# Patient Record
Sex: FEMALE | Race: WHITE | NOT HISPANIC OR LATINO | URBAN - METROPOLITAN AREA
[De-identification: names, ages, dates, MRNs, and addresses within clinical notes are randomized per-mention and may not be internally consistent; named-entity substitution may affect disease eponyms.]

---

## 2023-11-14 ENCOUNTER — TELEPHONE (OUTPATIENT)
Dept: TRANSPLANT | Facility: HOSPITAL | Age: 43
End: 2023-11-14
Payer: MEDICARE

## 2023-11-14 ENCOUNTER — DOCUMENTATION (OUTPATIENT)
Dept: TRANSPLANT | Facility: HOSPITAL | Age: 43
End: 2023-11-14
Payer: MEDICARE

## 2023-11-14 VITALS — WEIGHT: 194.67 LBS | BODY MASS INDEX: 35.82 KG/M2 | HEIGHT: 62 IN

## 2023-11-14 NOTE — PROGRESS NOTES
Do you have difficulty reading or writing in English?   no   What is the primary cause of your kidney disease?  Diabetes  Are you currently on dialysis?   yes  If yes, what days do you have your dialysis treatments?   T, TH, SAT  Have you received a transplant before?   no  If yes, what organ, and when and where was your transplant?     Have you been diagnosed with diabetes?    yes  Have you tested positive for hepatitis or HIV?   no  Have you ever been diagnosed with cancer?   no  If yes, what type of cancer, and when and where were you treated?     Do you have a history of a heart attack or stroke?   Yes, Heart Attack- 2021 Treated [facility], Pt had a CABG-  [facility]- 12/2021  Are you currently or have you previously been seen by a mental health professional?  Yes, Over 10 Yrs Ago  If yes, what is the name of your mental health provider?  Unknown  Are you a current or former tobacco user?   yes, Former  Do you have history of alcohol abuse or dependence?   no  Do you have a history of illegal drug abuse or dependence?   no  Has anyone told you they're willing to donate their kidney to you?   yes  Comments:  Intake Complete, Scheduled Virtual Edu.

## 2023-11-16 ENCOUNTER — HOSPITAL ENCOUNTER (INPATIENT)
Age: 43
End: 2023-11-16
Attending: SURGERY | Admitting: SURGERY
Payer: MEDICAID

## 2023-12-22 DIAGNOSIS — Z01.818 PRE-TRANSPLANT EVALUATION FOR KIDNEY TRANSPLANT: Primary | ICD-10-CM

## 2024-01-02 ENCOUNTER — TELEPHONE (OUTPATIENT)
Dept: TRANSPLANT | Facility: HOSPITAL | Age: 44
End: 2024-01-02
Payer: MEDICARE

## 2024-01-05 ENCOUNTER — APPOINTMENT (OUTPATIENT)
Dept: TRANSPLANT | Facility: HOSPITAL | Age: 44
End: 2024-01-05
Payer: MEDICARE

## 2024-01-05 ENCOUNTER — SOCIAL WORK (OUTPATIENT)
Dept: TRANSPLANT | Facility: HOSPITAL | Age: 44
End: 2024-01-05
Payer: MEDICARE

## 2024-01-05 ENCOUNTER — OFFICE VISIT (OUTPATIENT)
Dept: TRANSPLANT | Facility: HOSPITAL | Age: 44
End: 2024-01-05
Payer: MEDICARE

## 2024-01-05 ENCOUNTER — DOCUMENTATION (OUTPATIENT)
Dept: TRANSPLANT | Facility: HOSPITAL | Age: 44
End: 2024-01-05

## 2024-01-05 ENCOUNTER — LAB (OUTPATIENT)
Dept: LAB | Facility: LAB | Age: 44
End: 2024-01-05
Payer: MEDICARE

## 2024-01-05 ENCOUNTER — LAB REQUISITION (OUTPATIENT)
Dept: LAB | Facility: CLINIC | Age: 44
End: 2024-01-05
Payer: MEDICARE

## 2024-01-05 VITALS
SYSTOLIC BLOOD PRESSURE: 156 MMHG | HEART RATE: 89 BPM | BODY MASS INDEX: 35.26 KG/M2 | WEIGHT: 199 LBS | DIASTOLIC BLOOD PRESSURE: 106 MMHG | TEMPERATURE: 97.5 F | HEIGHT: 63 IN | OXYGEN SATURATION: 97 %

## 2024-01-05 VITALS
SYSTOLIC BLOOD PRESSURE: 156 MMHG | HEIGHT: 63 IN | TEMPERATURE: 97.5 F | DIASTOLIC BLOOD PRESSURE: 106 MMHG | HEART RATE: 89 BPM | BODY MASS INDEX: 35.26 KG/M2 | WEIGHT: 199 LBS | OXYGEN SATURATION: 97 %

## 2024-01-05 DIAGNOSIS — Z01.818 PRE-TRANSPLANT EVALUATION FOR KIDNEY TRANSPLANT: ICD-10-CM

## 2024-01-05 DIAGNOSIS — N18.6 END STAGE RENAL DISEASE (MULTI): ICD-10-CM

## 2024-01-05 DIAGNOSIS — Z79.899 OTHER LONG TERM (CURRENT) DRUG THERAPY: ICD-10-CM

## 2024-01-05 DIAGNOSIS — N18.9 CHRONIC KIDNEY DISEASE, UNSPECIFIED CKD STAGE: ICD-10-CM

## 2024-01-05 DIAGNOSIS — Z01.818 PRE-TRANSPLANT EVALUATION FOR KIDNEY TRANSPLANT: Primary | ICD-10-CM

## 2024-01-05 DIAGNOSIS — E11.21 DIABETES MELLITUS WITH KIDNEY DISEASE (MULTI): ICD-10-CM

## 2024-01-05 DIAGNOSIS — N18.6 ESRD (END STAGE RENAL DISEASE) (MULTI): ICD-10-CM

## 2024-01-05 DIAGNOSIS — I10 ESSENTIAL HYPERTENSION: ICD-10-CM

## 2024-01-05 LAB
ABO GROUP (TYPE) IN BLOOD: NORMAL
ALBUMIN SERPL BCP-MCNC: 4.3 G/DL (ref 3.4–5)
ALP SERPL-CCNC: 309 U/L (ref 33–110)
ALT SERPL W P-5'-P-CCNC: 18 U/L (ref 7–45)
AMYLASE SERPL-CCNC: 29 U/L (ref 29–103)
AST SERPL W P-5'-P-CCNC: 19 U/L (ref 9–39)
BILIRUB DIRECT SERPL-MCNC: 0.3 MG/DL (ref 0–0.3)
BILIRUB SERPL-MCNC: 0.8 MG/DL (ref 0–1.2)
BUN SERPL-MCNC: 36 MG/DL (ref 6–23)
C PEPTIDE SERPL-MCNC: 6.5 NG/ML (ref 0.7–3.9)
CMV IGG AVIDITY SERPL IA-RTO: REACTIVE %
CREAT SERPL-MCNC: 5.22 MG/DL (ref 0.5–1.05)
EBV EA IGG SER QL: NEGATIVE
EBV NA AB SER QL: POSITIVE
EBV VCA IGG SER IA-ACNC: POSITIVE
EBV VCA IGM SER IA-ACNC: ABNORMAL
ERYTHROCYTE [DISTWIDTH] IN BLOOD BY AUTOMATED COUNT: 17.6 % (ref 11.5–14.5)
EST. AVERAGE GLUCOSE BLD GHB EST-MCNC: 177 MG/DL
FLOW AUTOCROSSMATCH: NORMAL
GFR SERPL CREATININE-BSD FRML MDRD: 10 ML/MIN/1.73M*2
HBA1C MFR BLD: 7.8 %
HBV CORE AB SER QL: NONREACTIVE
HBV SURFACE AB SER-ACNC: >1000 MIU/ML
HBV SURFACE AG SERPL QL IA: NONREACTIVE
HCT VFR BLD AUTO: 35.5 % (ref 36–46)
HCV AB SER QL: NONREACTIVE
HGB BLD-MCNC: 11.1 G/DL (ref 12–16)
HIV 1+2 AB+HIV1 P24 AG SERPL QL IA: NONREACTIVE
HLA CLASS I AB SCREEN,FC: NORMAL
HLA CLASS II AB SCREEN,FC: NORMAL
HLA CLS I TYP PNL BLD/T DONR HIGH RES: NORMAL
HLA RESULTS: NORMAL
HLA-DP2 QL: NORMAL
HLA-DQB1 HIGH RES: NORMAL
HLA-DRB1 HIGH RES: NORMAL
INR PPP: 1.1 (ref 0.9–1.1)
MCH RBC QN AUTO: 33.7 PG (ref 26–34)
MCHC RBC AUTO-ENTMCNC: 31.3 G/DL (ref 32–36)
MCV RBC AUTO: 108 FL (ref 80–100)
NRBC BLD-RTO: 0 /100 WBCS (ref 0–0)
PHOSPHATE SERPL-MCNC: 4.2 MG/DL (ref 2.5–4.9)
PLATELET # BLD AUTO: 160 X10*3/UL (ref 150–450)
PROT SERPL-MCNC: 7.7 G/DL (ref 6.4–8.2)
PROTHROMBIN TIME: 12.8 SECONDS (ref 9.8–12.8)
RBC # BLD AUTO: 3.29 X10*6/UL (ref 4–5.2)
RH FACTOR (ANTIGEN D): NORMAL
T PALLIDUM AB SER QL: NONREACTIVE
WBC # BLD AUTO: 4.7 X10*3/UL (ref 4.4–11.3)

## 2024-01-05 PROCEDURE — 87340 HEPATITIS B SURFACE AG IA: CPT

## 2024-01-05 PROCEDURE — 99204 OFFICE O/P NEW MOD 45 MIN: CPT | Performed by: STUDENT IN AN ORGANIZED HEALTH CARE EDUCATION/TRAINING PROGRAM

## 2024-01-05 PROCEDURE — 85027 COMPLETE CBC AUTOMATED: CPT

## 2024-01-05 PROCEDURE — 84520 ASSAY OF UREA NITROGEN: CPT

## 2024-01-05 PROCEDURE — 84100 ASSAY OF PHOSPHORUS: CPT

## 2024-01-05 PROCEDURE — 86665 EPSTEIN-BARR CAPSID VCA: CPT

## 2024-01-05 PROCEDURE — 86900 BLOOD TYPING SEROLOGIC ABO: CPT

## 2024-01-05 PROCEDURE — 86803 HEPATITIS C AB TEST: CPT

## 2024-01-05 PROCEDURE — 85610 PROTHROMBIN TIME: CPT

## 2024-01-05 PROCEDURE — 86664 EPSTEIN-BARR NUCLEAR ANTIGEN: CPT

## 2024-01-05 PROCEDURE — 86825 HLA X-MATH NON-CYTOTOXIC: CPT | Mod: OUT | Performed by: SURGERY

## 2024-01-05 PROCEDURE — 87389 HIV-1 AG W/HIV-1&-2 AB AG IA: CPT

## 2024-01-05 PROCEDURE — 99205 OFFICE O/P NEW HI 60 MIN: CPT | Performed by: INTERNAL MEDICINE

## 2024-01-05 PROCEDURE — 87799 DETECT AGENT NOS DNA QUANT: CPT

## 2024-01-05 PROCEDURE — 80323 ALKALOIDS NOS: CPT

## 2024-01-05 PROCEDURE — 80076 HEPATIC FUNCTION PANEL: CPT

## 2024-01-05 PROCEDURE — 82565 ASSAY OF CREATININE: CPT

## 2024-01-05 PROCEDURE — 83036 HEMOGLOBIN GLYCOSYLATED A1C: CPT

## 2024-01-05 PROCEDURE — 86644 CMV ANTIBODY: CPT

## 2024-01-05 PROCEDURE — 86780 TREPONEMA PALLIDUM: CPT

## 2024-01-05 PROCEDURE — 86901 BLOOD TYPING SEROLOGIC RH(D): CPT

## 2024-01-05 PROCEDURE — 84681 ASSAY OF C-PEPTIDE: CPT

## 2024-01-05 PROCEDURE — 86481 TB AG RESPONSE T-CELL SUSP: CPT

## 2024-01-05 PROCEDURE — 82150 ASSAY OF AMYLASE: CPT

## 2024-01-05 PROCEDURE — 3077F SYST BP >= 140 MM HG: CPT | Performed by: INTERNAL MEDICINE

## 2024-01-05 PROCEDURE — 86704 HEP B CORE ANTIBODY TOTAL: CPT

## 2024-01-05 PROCEDURE — 86706 HEP B SURFACE ANTIBODY: CPT

## 2024-01-05 PROCEDURE — 3051F HG A1C>EQUAL 7.0%<8.0%: CPT | Performed by: INTERNAL MEDICINE

## 2024-01-05 PROCEDURE — 80349 CANNABINOIDS NATURAL: CPT

## 2024-01-05 PROCEDURE — 99215 OFFICE O/P EST HI 40 MIN: CPT | Performed by: INTERNAL MEDICINE

## 2024-01-05 PROCEDURE — 36415 COLL VENOUS BLD VENIPUNCTURE: CPT

## 2024-01-05 PROCEDURE — 80307 DRUG TEST PRSMV CHEM ANLYZR: CPT

## 2024-01-05 PROCEDURE — 81379 HLA I TYPING COMPLETE HR: CPT | Mod: OUT | Performed by: SURGERY

## 2024-01-05 PROCEDURE — 3080F DIAST BP >= 90 MM HG: CPT | Performed by: INTERNAL MEDICINE

## 2024-01-05 PROCEDURE — 99214 OFFICE O/P EST MOD 30 MIN: CPT | Mod: 25 | Performed by: STUDENT IN AN ORGANIZED HEALTH CARE EDUCATION/TRAINING PROGRAM

## 2024-01-05 PROCEDURE — 86663 EPSTEIN-BARR ANTIBODY: CPT

## 2024-01-05 ASSESSMENT — PAIN SCALES - GENERAL
PAINLEVEL: 0-NO PAIN
PAINLEVEL: 0-NO PAIN

## 2024-01-05 NOTE — PROGRESS NOTES
"ENCOUNTER    Visit Type Initial Visit  Location: [place] [address]    Barriers to Communication / Understanding:   [] Language [] Vision [] Hearing [] Other      []  Present     Accompanied By: Friend, Lauren    Organ For Transplant:  Kidney    Ethnicity:  White    ADLs Fully Independent      Instrumental ADLs Fully Independent      Level of Activity Very Active      DME: Wheelchair, prosthetic leg    Knowledge of Health Good  Diabetes, HTN    Why Do You Have End Stage Organ Disease   Pt reported the cause of her kidney disease is diabetes.     Knowledge of Transplant / VAD:  Yes Patient Is Able To Make An Informed Decision    Patient Understands the Risks of Transplant / VAD  Yes Rejection  Yes Infection Yes Complications  Yes Death    Patient Understands Recovery and Follow-Up from Transplant / VAD  Yes Length Of State Yes Appointments  Yes Labs  Yes Rehabilitation    Patient Has Identified Goals of Transplant / VAD Yes  Pt reported a goal of transplant is \"to live longer, be around, be more active, and to be able to go swimming with my daughter.\"    Any Potential Donors?     Overall Compliance  Poor     Pt reported she takes 3-4 medications daily.    Compliance With Medications Poor  Pt reported she stopped taking all medications except her diabetes medications without speaking with doctor about 6 months ago. Pt shared she stopped taking the medications because they made her sick.   Managed By Patient   Bottles    Understanding Of Medication  Fair  Compliance With Appointments Good    How Does Patient Handle Health Problems      Organ  Kidney    IOP:     Fluid Restriction:   Yes [] Compliant   Pt reported her fluid restriction is less than 2 liters a day. Pt reported she is not compliant with this amount.     Medical And Clinic Appointment Compliant     Dialysis:  [x] What Dialysis Center   [facility] [x] Began   February 2020      [] In Center [x] Home Hemo   July 2022 [] Peritoneal     Pt " "reported she started PD in February 2020 then began home hemo in July 2022. Pt shared she began in person treatments in August 2022.     Attendance:  Treatment Attendance Fair Treatment Time T, Th, Sat - runs for 4 hours    [] Shortened Treatments [x] Rescheduled Treatments [x] Missed Treatments   Pt reported she shortens and misses treatments \"not very often\" but was not able to quantify the amount. Pt shared she tries to make up her missed time but cannot sometimes due to feeling sick.     Fluids:     Does Patient Still Urinate  [] Fluid Restriction [] IDWG [] EDW  Achieved Dry Weight        Diet:   Patient is compliant with renal restrictions     Patient is compliant with low sodium diet      Labs:    [] Patient Reported [] Collateral       []  Potassium [] Albumin [] Phosphorus      # of Binders:  [x] # of Binders per meal   2 [x] Meals per day   1-4      [x]  # of Binders per Snack   1-2 [] Snacks per day [] Phosphorus   Pt reported she is supposed to take binders with snacks but usually does not.     Pancreas:  [] Checks blood sugar      times/day [] A1c   Hypoglycemic Episodes  Unawareness  Outside Interventions    Liver:  Is Lactulose prescribed  Dose:   Timesper day:  Is patient compliant       SOCIAL HISTORY  No       Education:  education: HS Diploma    Literate Yes   Computer literate Yes  Internet access Yes       Sources of Income: SSDI ($1750 monthly), child support ($1000 monthly)  Patient's Current Employment    [] Full-Time [] Part-Time [] Unemployed [] Retired     []  None [] Not working by choice [x] Not working disabled     [] Short Term Leave   [] Other   Employment History     Will patient have paid status from employment during recovery     Spouse / SO Current Employment     Will spouse / SO have paid status from employment during recovery       Other Sources of Income       Does patient have financial concerns   No     Is patient able to meet current monthly expenses   " "Yes    Resources:  Utility Assistance VENKAT    Patient was provided information on transplant fundraising       Insurance:  Primary Insurance Medicare    Secondary Insurance Medicaid    Prescription Coverage Copay cost per month $0    Medicare coverage due to     Medicare Part A  Effective date    Medicare Part B  Effective date    Medicare Part C  Deductable  Out of Pocket Max    Medicare Part D  Extra Help?    Medicaid  Waiver  Redetermination Date    HMO       FAMILY SYSTEM    Single Yes    How long   Describe Relationship    How long   Describe Relationship    When    When  In a Relationship   How Long  Describe Relationship    Spouse / SO Name   Age   Health   Other Caregiver Responsibilities  Spouse / Significant others reaction to donation    Children:  Yes # Biological   # Adopted    # Step Children        Child #1 Name Jimmy  Age 16  Health \"Fine\"    Lives With patient  How Much Contact Daily      Child #2 Name Elvia  Age 4  Health \"Good\"    Lives With patient  How Much Contact Daily    Child #3 Name Age  Health  Lives   How Much Contact     Parents:  Raised By Both Biological Parents    Did the patient have contact with the other parent     Mother  No Age   Cause of Death   Father  Yes Age 58  Cause of Death Heart stopped    Living Parent #1 Coral, lives local, daily contact  Living Parent #2    Additional Information    Siblings:  [x] # Biological (1 brother) [x] # Half Siblings (1 brother) [] # Step Siblings       5 adopted siblings     Sibling #1  Sibling #2    Support & Recovery Plan:  Yes Adequate    Primary Support:  Name Lauren Phone [phone]  Age 44  Relationship to Patient Friend  If employed, can they take time off work Yes   If so, is it paid time off Yes   If not, will this impact your finances    Did they attend education classes No   Do they have other caregiver responsibilities (child or eldercare) Yes  Pt's friend's mom can " "watch the child.  Do they have their own conditions which may prevent them from providing care for you No  (Medical, psychological, physical limitations)    Are they available on short notice Yes   Are they reliable Yes   Are they responsible Yes   Are they able to understand and process new information Yes   Do they have reliable transportation or will you allow them to use your vehicle Yes   Are they currently involved in your care Yes   Comments    Secondary Support:  Name Coral Phone [phone] Age 66  Relationship to Patient Mom  If employed, can they take time off work Yes  If so, is it paid time off    If not, will this impact your finances    Did they attend education classes No   Do they have other caregiver responsibilities (child or eldercare) Yes  Pt reported her mom is a . Pt shared her father's cousin who is also a licensed  can provide respite care for the children.  Do they have their own conditions which may prevent them from providing care for you No  (Medical, psychological, physical limitations)    Are they available on short notice Yes   Are they reliable Yes   Are they responsible Yes   Are they able to understand and process new information Yes   Do they have reliable transportation or will you allow them to use your vehicle Yes   Are they currently involved in your care Yes   Comments    Alternate Support  Alternate Support    Housing:  Yes Adequate Rents home  Type of Home Trailer  Distance to [facility] 1 hr 15 minutes   Pets 0  Does Patient Feel Safe in Home Yes      Transportation:  Yes Adequate  # Licensed Drivers in the Home 2  Does Patient Drive Yes If not, why  # Reliable Vehicles 0  Does Patient use Public Transportation No  Does Patient use Medical Transportation No  Comments  Pt reported she owns a car but it needs work to be drivable.     MENTAL HEALTH    Cognition:  No impairment observed / reported    The patient reports their mood as \"I'm " "fine.\"    Reported Mental Health Diagnosis   Anxiety  Family History of Mental Health Concerns   Denied  What are patient psychosocial stressors   Pt reported her kids are a current stressor.        Current Medications:  Yes  Mental Health Meds  Zoloft Rx'd by PCP  Sleep Meds  Denied  Rx'd by   Pain Meds  Denied  Rx'd by     OTC Meds   Denied  Past Medications   Denied    Counseling Never   provided  resources for Pt's [facility].    Has patient ever been hospitalized for mental health reasons No   Was the hospitalization voluntary  Duration   Where    When  Describe situation    Discharge Plan for Follow Up  Was Discharge Plan Completed   Referral to Transplant Psych   Yes  Mental Health Follow Up Required      Suicide Assessment:  History of Suicide Ideation No  [] Timeframe [] Frequency   Frequency   Plan Created  Intent to Follow Through  Outcome      History of Suicide Attempt No     History of Suicidal Ideation in the past 3 months No   Intensity   Duration     Description of Plan      Plans thought of  Intent to Follow Through  Highest Level of Intent to Follow Through    Current Plan for Safety    Plan for Follow-Up    Patient's Reported Trauma History  Pt reported a history of trauma.    What are patient's coping behaviors   Pt reported she enjoys cooking, watching TV, playing on her phone, and watching her kids play sports/play outside.     Jewish / Spirituality   Pt reported she is \"not at the moment\" Cheondoism or spiritual.    Attitude toward interviewer Cooperative and Appropriate    Eye Contact Patient maintained good eye contact throughout appointment    Appearance The patient was neatly groomed, appropriately dressed and adequately nourished    Affect Appropriate    Thought Process Appropriate    Substance Use /Abuse History:    Current Tobacco User No  Patient uses   Tobacco Frequency   For How Long  Is Patient Required to Quit     Former Tobacco User Yes  Describe past tobacco use and date " "quit  Pt reported she smoked cigarettes and used chewing tobacco in the past. Pt shared she smoked 1 PPD \"on and off\" for 13 years. Pt reported the last time she smoked cigarettes was 10 years ago. Pt reported she went through one can of chewing tobacco a day for 4 years. Pt shared the last time she used chewing tobacco was in 2021 when she had her heart attack.     Current Alcohol User Yes  Type of Alcohol Used   Amount  Frequency Holidays/Birthdays  Pattern of Alcohol Use  Pt reported she drinks alcohol on holidays/birthdays. Pt shared the last time she drank alcohol was on Hol[place]. Pt reported she had one drink in a sitting the last time she drank. Pt reported this pattern of alcohol use has been consistent for 7 years.    Is Patient Required to Quit   Continued to use the substance despite being told the substance is affecting their health    History of problems at work, school or home due to substance use      Former Alcohol User Yes  Describe past alcohol use and date quit  Pt reported she drank \"socially\" in the past. Pt shared she drank alcohol twice a week during that time period. Pt reported she would drink \"three times a weekend, every weekend\" at ages [age].      Has patient ever gone to CD treatment No  If yes, When, Where and What type of Program  Attends AA meetings Never   Sponsor  Do support people drink alcohol Yes  If yes, describe support people's use Pt's primary support reported she drinks alcohol 1-2 times a week.  Is alcohol kept in the home No  Does Patient need to sign a CD contract     Current Illegal / Unprescribed Drug User Yes  Type of Illegal Drug Used Marijuana  Frequency 1-2 times a week  Pattern of Drug Use  Pt reported she smokes marijuana and uses edibles. Pt shared she smokes marijuana 1-2 times a week. Pt reported she will have 2 hits of a vape in a sitting. Pt reported if she does not use a vape, she will use a blunt which lasts her one week. Pt shared she last smoked " marijuana last night. Pt reported she will use edibles once a month. Pt shared she eats 2 gummies but is unsure of the dose. Pt reported the last time she used an edible was around Thanksgiving. Pt shared she uses marijuana for pain management and sleep. Pt reported this pattern of marijuana use has been consistent for 2 years.    Is Patient Required to Quit     Illegal / Unprescribed Drug #2  Type of Illegal Drug Used   Frequency  Pattern of Drug Use      Continue to use the substance despite being told the substance is affecting their health    History of problems at work, school or home due to substance use      Former Illegal / Unprescribed Drug User Yes  Describe past illegal drug use and date quit  Pt denied any history of heavier marijuana use in the past.    Has patient ever gone to CD treatment   If yes, When, Where and What type of Program   Attends AA/NA  meetings    Is patient on a Methadone / Suboxone regiment   Do support people use illegal drugs   If yes, describe support people's use  Are illegal drugs kept in the home   Does Patient need to sign a CD contract     Illegal / Unprescribed Drug #2  Type of Illegal Drug Used   Frequency  Pattern of Drug Use    Prescription Drug Abuse:  No Has patient experienced feelings of addiction  No Has patient experienced symptoms of withdrawal  No Has patient experienced any side effects? e.g.  hallucinations or delusions    Does Patient Meet the Criteria for Alcohol Use Disorder No Diagnosis  Does Patient Meet [facility]OT[facility] guidelines No  Does Patient Meet the Criteria for Illegal Drug Use Disorder No Diagnosis  Does Patient Meet [facility]OTC guidelines No    [facility] Substance Relapse Risk Factors   DSM-5 Severity Factors:     IOP     LEGAL ISSUES  No Arrests   Currently probation or parole    [facility]   When   How long   Where       Citizenship:  Yes [place] Citizen   Green Card  Visa    Advance Directives: Yes  NGOZI REAVES requested copies.    Guardian:    KASEY REAVES  "met with Pt and Pt's friend, Lauren, for initial psychosocial assessment. Pt was pleasant and engaged throughout the assessment. Pt reported she has Medicare and Medicaid for insurance. Pt demonstrated a good understanding of the risks of transplant and recovery process. Pt denied any financial concerns at this time. Pt reported the cause of her kidney disease is diabetes. Pt reported a goal of transplant is \"to live longer, be around, be more active, and to be able to go swimming with my daughter.\" Pt reported poor compliance with medications. Pt reported she stopped taking all medications except her diabetes medications without speaking with doctor about 6 months ago. Pt shared she stopped taking the medications because they made her sick. Pt reported fair compliance with dialysis treatments. Pt reported she shortens and misses treatments \"not very often\" but was not able to quantify the amount. Pt shared she tries to make up her missed time but cannot sometimes due to feeling sick. Pt reported good compliance with appointments. Pt listed her friend, Lauren, as primary support and her mom, Coral, as secondary support. Pt reported both supports are adequate. Pt reported her mood as \"I'm fine.\" Pt reported she was diagnosed with anxiety in the past. Pt shared she is prescribed Zoloft, but it was unclear to SW if Pt is taking medication as prescribed. Pt scored a 7 on the PHQ-9, indicating mild clinical depression. Pt scored a 7 on the ROSSY-7, indicating mild clinical anxiety. SW referral to transplant psych. Pt denied any current tobacco use. Pt shared she smoked 1 PPD \"on and off\" for 13 years. Pt reported the last time she smoked cigarettes was 10 years ago. Pt reported she went through one can of chewing tobacco a day for 4 years. Pt shared the last time she used chewing tobacco was in 2021. Pt reported she drinks alcohol on holidays/birthdays. Pt shared the last time she drank alcohol was on Jada. Pt " "reported she had one drink in a sitting the last time she drank. Pt reported this pattern of alcohol use has been consistent for 7 years. Pt reported she drank \"socially\" in the past. Pt shared she drank alcohol twice a week during that time period. Pt shared she smokes marijuana 1-2 times a week. Pt reported she will have 2 hits of a vape in a sitting. Pt reported if she does not use a vape, she will use a blunt which lasts her one week. Pt shared she last smoked marijuana last night. Pt reported she will use edibles once a month. Pt shared she eats 2 gummies but is unsure of the dose. Pt reported the last time she used an edible was around Thanksgiving. Pt reported this pattern of marijuana use has been consistent for 2 years. Pt denied any history of heavier marijuana use in the past. Pt scored a 20 on the SIPAT, indicating she is a good candidate for transplant. SW would recommend listing Pt once Pt's identified risk factors have been satisfactorily addressed. Pt's risk factors include Pt's poor medication compliance, Pt's fair dialysis treatment compliance, and Pt's unclear compliance with MH treatment.    PLAN  Pt to meet with transplant psych. SW to await recommendations from transplant psych. SW to meet with Pt in 3 months to monitor compliance. SW to call and confirm secondary support.     "

## 2024-01-05 NOTE — PROGRESS NOTES
Patient attended in-person consent signing on 1/5/2024.  Virtual education was completed prior to in-person consent signing.  Accompanied to class with her support person Lauren.  Patient was using a WC and she was wearing a right leg prothesis. Patient remained attentive throughout the review session, asking appropriate questions.  Evaluation consents were signed.     PRE-TRANSPLANT EDUCATION  Patient received education regarding the following topics as part of their pre-transplant evaluation:  The evaluation process, including:   Transplant team members and roles    Required consultations and testing   Selection criteria and suitability for transplant   Listing process and receiving an organ offer   Psychosocial and financial considerations for a successful transplant   Patient responsibilities, including the necessity of adhering to a strict medical regimen  An overview of the surgical procedure   Potential medical, surgical, and psychosocial risks to transplantation, including:   Wound infection   Pneumonia   Blood clot formation   Organ rejection, failure, and possibility of re-transplantation   Lifetime immunosuppression therapy and associated risks   Arrhythmias and cardiovascular collapse   Multi-organ system failure   Death   Depression   Post-Traumatic Stress Disorder   Generalized anxiety, issues of dependence, and feelings of guilt  Available alternatives to transplantation  Donor risk factors that could affect the success of the transplant and the health of the patient, including:   Donor age   Donor medical and social history   Condition of the organ   Risk of liborio cancer, HIV, Hepatitis B, Hepatitis C, or malaria if the infection is not detectable at the time of donation  Patient?s right to withdraw consent for transplantation at any time during the process  Transplants not performed in a Medicare-approved transplant center could affect the patient?s ability to have immunosuppression  medication paid for under Medicare part B.   Multiple listing options.    Patient was given the opportunity to have questions answered. Patient was provided a copy of the informed consent for transplant evaluation.    Signed evaluation informed consent received? 1/5/2024

## 2024-01-05 NOTE — PROGRESS NOTES
Spoke with Maru regarding living donation.  She does have potential donors. She was provided with the tip sheet on how to talk about living donation, the [facility] microsites as well as the contact information for donors to start the process. Maru said that she is interested in developing a microsite. I explained that once she has been sent the information from [facility] she will need to sign a HIPAA release. Maru expressed understanding. Demographics verified and pt invitation sent on 1/5/2024.

## 2024-01-05 NOTE — PATIENT INSTRUCTIONS
Thank you for coming to [facility].  You are currently in evaluation for a kidney transplant.  In order for you to be eligible to be listed for a kidney transplant you must complete the testing/appointments below.  These tests/appointments will be scheduled for you:    - Finance Visit (Virtual)  - Cardiology Apt.    The following tests need to be scheduled through your primary care provider:    - Mammogram  - Pap Smear    If you have any questions, please contact your coordinator Yenifer Jeffrey RN at [phone].

## 2024-01-05 NOTE — PROGRESS NOTES
Kidney Transplant Evaluation Office Visit    Chief Complaint: Patient presents for kidney transplant evaluation    History of Present Illness:  Maru Sylvester  is a 43 y.o. female presents with ESRD from Type 2 DM. She started with PD in 2020 and switched to HD in 2022 due to increasing PD duration. She has tolerated the HD well.     She has had MI in  and underwent CABG in 2021. She attributes the MI to taking the COVID vaccine. Her EF on an ECHO 2023 was 23%. She also underwent Right BKA in 2022 for a non-healing ankle wound.    Today in clinic she denies chest pain, SOB or palpitations. Also denies any fever, abdominal pain, difficulty voiding or other urinary symptoms, constipation, or poor oral intake.    Hemodialysis: T Th Fr  Dialysis Access: Left Neck Vascath  Urine Status: oliguric.   Disease Etiology: diabetic nephropathy and hypertensive nephropathy.   Disease Complications: MI, BKA  PVD: YES on exam, CT import pending  Prior Abdominal Surgery: YES - Lap Cholecystectomy, Lap appendectomy,  x2   Prior Malignancy: NO   BMI: 35.25  Potential Living Donors: NO     Review of Systems:  Cardiac: Denies chest pain, palpitations  : Normal urine output. Denies history of gross hematuria, nephrolithiasis, urinary retention, or recurrent UTIs.  Vascular: Denies personal or familial history of DVT/PE. No active claudication or non-healing LE wounds.  Extremities: LE Edema -   Functional Status: Can walk up 2 flights of stairs    Past Medical History:  Type 2 DM  MI/ CABG  ESRD on HD  PVD    Past Surgical History:   x 2  Lap cholecystectomy  Lap appendectomy  L arm AVF failed due to steal syndrome    Social History:  Past remote smoker  No drug or etoh overuse  Lives an independent life and her best friend is her support who is here with her    Transfusions: Yes - she is unsure when but confirms one transfusion in past  Pregnancy: 6 - 2 children  Prior transplant:  None    Family History:  Mother: n/a  Father: n/a  Sibling: n/a    Physical Exam:  There were no vitals filed for this visit.    Gen: A+OX3; NAD  HEENT: PERRL, sclera anicteric, MMM  Cardiac: RRR  Chest: Normal inspiratory effort  Abdomen: S/NT/ND.  Ext: No LE edema; Rt BKA prosthesis  Vascular: 1+ palpable femoral pulses  Psychiatric: Normal mood, affect    Assessment/Plan:  - The patient is a marginal candidate for kidney transplantation currently in vie of 23% EF   - will refer to  Cardiology for cardiac optimization   - ECHO /stress test    - Routine age/gender based screening    - Non-contrast CT scan abdomen/pelvis - import [facility]    - Good functional status    - follow-up A1c    Surgical Considerations:  Import and review CT AP  Cardiology optimization - EF 23% Hs of MI    Transplant Education:    I had a discussion with this patient regarding 1 year graft and patient survival statistics following renal transplantation for both living and  donor allograft recipients. This data included [facility] data compared to National data readily available for review on https://www.SRTR.org. The patient also had attended the kidney transplant education class provided by the transplant institute.     The difference between allograft function was discussed comparing living donor, KDPI 0-85%, and >85% kidneys.     Further discussion included:  -The transplant selection committee process.  -The need for lifelong immunosuppressive therapy, and the side effects of these medications including the risk of infections, cancer, and lymphoma.  -The wait list time approximately is 5 years or more for  donor transplants and the statistical superiority of a living donor.     -Using identified donors with risk criteria for transmission of infection  -The possibility of utilizing  donors with known HCV antibody and/or ERVIN positivity and post-transplant treatment/surveillance protocol  -Potential  transmission of infectious disease from any  donors, as well as living donors.   -The possibility of transmission of tumors and infections via the transplanted organ.  -The inability to completely test for all potential harmful tumors or infectious agents.  -The possibility of listing at multiple locations.     Surgical complications including need for reoperation(s) including but not limited to:  -Bleeding.  -Repair of leaks.  -Control of infection.  -Blood clots in the transplant vessels.  -Possible kidney transplant removal.     The medical complications including but not limited to:  -Death.  -Cardiac.  -Pulmonary.  -Infectious.  -Neurologic.  -Other Complications.     We also discussed how the kidney transplant could function:  -Non-function and possible kidney transplant removal within the first 3 to 6 months.  -Delayed graft function (dialysis needed after transplant).  -The potential of recurrence of kidney disease leading to kidney transplant graft loss.    Time Attestation:  I spent 60 minutes with the patient, over 50 minutes in counseling and education as outlined above.    Ken Boucher MD, [facility]  Transplant & Hepatobiliary Surgery

## 2024-01-06 LAB — VZV QPCR,QUANT,PLASMA, VIRC: NOT DETECTED COPIES/ML

## 2024-01-07 LAB
AMPHETAMINES SERPL QL SCN: NEGATIVE NG/ML
ANNOTATION COMMENT IMP: NORMAL
BARBITURATES SERPL QL SCN: NEGATIVE NG/ML
BENZODIAZ SERPL QL SCN: NEGATIVE NG/ML
BUPRENORPHINE SERPL-MCNC: NEGATIVE NG/ML
CANNABINOIDS SERPL QL SCN: POSITIVE NG/ML
COCAINE SERPL QL SCN: NEGATIVE NG/ML
EBV VCA IGM SER-ACNC: <10 U/ML (ref 0–43.9)
METHADONE SERPL QL SCN: NEGATIVE NG/ML
METHAMPHET SERPL QL: NEGATIVE NG/ML
NIL(NEG) CONTROL SPOT COUNT: NORMAL
OPIATES SERPL QL SCN: NEGATIVE NG/ML
OXYCODONE SERPL QL: NEGATIVE NG/ML
PANEL A SPOT COUNT: 0
PANEL B SPOT COUNT: 0
PCP SERPL QL SCN: NEGATIVE NG/ML
POS CONTROL SPOT COUNT: NORMAL
T-SPOT. TB INTERPRETATION: NEGATIVE

## 2024-01-08 LAB
COTININE SERPL-MCNC: <5 NG/ML
NICOTINE SERPL-MCNC: <5 NG/ML

## 2024-01-08 ASSESSMENT — ANXIETY QUESTIONNAIRES
2. NOT BEING ABLE TO STOP OR CONTROL WORRYING: SEVERAL DAYS
GAD7 TOTAL SCORE: 7
3. WORRYING TOO MUCH ABOUT DIFFERENT THINGS: MORE THAN HALF THE DAYS
6. BECOMING EASILY ANNOYED OR IRRITABLE: NEARLY EVERY DAY
1. FEELING NERVOUS, ANXIOUS, OR ON EDGE: NOT AT ALL
7. FEELING AFRAID AS IF SOMETHING AWFUL MIGHT HAPPEN: NOT AT ALL
5. BEING SO RESTLESS THAT IT IS HARD TO SIT STILL: NOT AT ALL
4. TROUBLE RELAXING: SEVERAL DAYS

## 2024-01-08 ASSESSMENT — PATIENT HEALTH QUESTIONNAIRE - PHQ9
2. FEELING DOWN, DEPRESSED OR HOPELESS: NOT AT ALL
1. LITTLE INTEREST OR PLEASURE IN DOING THINGS: NOT AT ALL
5. POOR APPETITE OR OVEREATING: SEVERAL DAYS
8. MOVING OR SPEAKING SO SLOWLY THAT OTHER PEOPLE COULD HAVE NOTICED. OR THE OPPOSITE, BEING SO FIGETY OR RESTLESS THAT YOU HAVE BEEN MOVING AROUND A LOT MORE THAN USUAL: NOT AT ALL
6. FEELING BAD ABOUT YOURSELF - OR THAT YOU ARE A FAILURE OR HAVE LET YOURSELF OR YOUR FAMILY DOWN: NOT AT ALL
9. THOUGHTS THAT YOU WOULD BE BETTER OFF DEAD, OR OF HURTING YOURSELF: NOT AT ALL
SUM OF ALL RESPONSES TO PHQ9 QUESTIONS 1 & 2: 0
3. TROUBLE FALLING OR STAYING ASLEEP OR SLEEPING TOO MUCH: NEARLY EVERY DAY
7. TROUBLE CONCENTRATING ON THINGS, SUCH AS READING THE NEWSPAPER OR WATCHING TELEVISION: SEVERAL DAYS
4. FEELING TIRED OR HAVING LITTLE ENERGY: MORE THAN HALF THE DAYS
SUM OF ALL RESPONSES TO PHQ QUESTIONS 1-9: 7

## 2024-01-10 ENCOUNTER — TELEPHONE (OUTPATIENT)
Dept: TRANSPLANT | Facility: HOSPITAL | Age: 44
End: 2024-01-10
Payer: MEDICARE

## 2024-01-10 LAB
FLOW AUTOCROSSMATCH: NORMAL
HLA RESULTS: NORMAL

## 2024-01-10 NOTE — TELEPHONE ENCOUNTER
SW attempted to reach Pt's mother via telephone call to confirm commitment as secondary support. SW was unable to reach Pt's mother and left VM with SW contact information.    Plan: SW to await return phone call.

## 2024-01-11 LAB
HLA CLS I TYP PNL BLD/T DONR HIGH RES: NORMAL
HLA RESULTS: NORMAL
HLA-DP2 QL: NORMAL
HLA-DQB1 HIGH RES: NORMAL
HLA-DRB1 HIGH RES: NORMAL

## 2024-01-12 LAB — CANNABINOIDS SERPL-MCNC: 6 NG/ML

## 2024-01-15 LAB
HLA CLASS I AB SCREEN,FC: NORMAL
HLA CLASS II AB SCREEN,FC: NORMAL
HLA RESULTS: NORMAL

## 2024-01-17 ENCOUNTER — TELEPHONE (OUTPATIENT)
Dept: TRANSPLANT | Facility: HOSPITAL | Age: 44
End: 2024-01-17
Payer: MEDICARE

## 2024-01-17 NOTE — TELEPHONE ENCOUNTER
SW spoke with Pt's mother, Coral, via telephone call to confirm commitment as secondary support. Pt's mother reported they are working as a  at this time. Pt's mother shared she has back-up caregivers who are licensed foster parents who can provide respite care to the children. Pt's mother denied any medical, psychological, or physical limitations. Pt's mother reported they drive and have a working vehicle. Pt's mother reported they are committed to helping Pt through transplant process.    Plan: SW to remain available.

## 2024-01-29 DIAGNOSIS — Z01.818 PRE-TRANSPLANT EVALUATION FOR KIDNEY TRANSPLANT: ICD-10-CM

## 2024-02-02 ENCOUNTER — TELEPHONE (OUTPATIENT)
Dept: TRANSPLANT | Facility: HOSPITAL | Age: 44
End: 2024-02-02
Payer: MEDICARE

## 2024-02-05 ENCOUNTER — TELEPHONE (OUTPATIENT)
Dept: TRANSPLANT | Facility: HOSPITAL | Age: 44
End: 2024-02-05
Payer: MEDICARE

## 2024-02-07 ENCOUNTER — TELEPHONE (OUTPATIENT)
Dept: TRANSPLANT | Facility: HOSPITAL | Age: 44
End: 2024-02-07
Payer: MEDICARE

## 2024-02-07 ENCOUNTER — OTHER (OUTPATIENT)
Dept: TRANSPLANT | Facility: HOSPITAL | Age: 44
End: 2024-02-07
Payer: MEDICARE

## 2024-02-07 ENCOUNTER — DOCUMENTATION (OUTPATIENT)
Dept: TRANSPLANT | Facility: HOSPITAL | Age: 44
End: 2024-02-07
Payer: MEDICARE

## 2024-02-07 NOTE — PROGRESS NOTES
Spoke to patient today via the phone re her [employer] Medicare & [employer] benefits.  Patient is aware she has benefits for a LD.  Patient is aware Medicare part B, not D, will cover her immuno meds.  Discussed importance of maintaining her Medicaid benefits.  Patient was disabled prior to her ESRD.

## 2024-02-16 ENCOUNTER — APPOINTMENT (OUTPATIENT)
Dept: CARDIOLOGY | Facility: HOSPITAL | Age: 44
End: 2024-02-16
Payer: COMMERCIAL

## 2024-02-23 ENCOUNTER — HOSPITAL ENCOUNTER (OUTPATIENT)
Dept: CARDIOLOGY | Facility: HOSPITAL | Age: 44
Discharge: HOME | End: 2024-02-23
Payer: MEDICARE

## 2024-02-23 ENCOUNTER — TELEPHONE (OUTPATIENT)
Dept: TRANSPLANT | Facility: HOSPITAL | Age: 44
End: 2024-02-23

## 2024-02-23 DIAGNOSIS — Z01.818 PRE-TRANSPLANT EVALUATION FOR KIDNEY TRANSPLANT: ICD-10-CM

## 2024-02-23 LAB
AORTIC VALVE MEAN GRADIENT: 4 MMHG
AORTIC VALVE PEAK VELOCITY: 1.25 M/S
AV PEAK GRADIENT: 6.3 MMHG
AVA (PEAK VEL): 2.2 CM2
AVA (VTI): 1.88 CM2
EJECTION FRACTION APICAL 4 CHAMBER: 22.7
EJECTION FRACTION: 24 %
LEFT ATRIUM VOLUME AREA LENGTH INDEX BSA: 41.7 ML/M2
LEFT VENTRICLE INTERNAL DIMENSION DIASTOLE: 6 CM (ref 3.5–6)
LEFT VENTRICULAR OUTFLOW TRACT DIAMETER: 2 CM
MITRAL VALVE E/A RATIO: 2.53
MITRAL VALVE E/E' RATIO: 19.33
RIGHT VENTRICLE FREE WALL PEAK S': 3.49 CM/S
RIGHT VENTRICLE PEAK SYSTOLIC PRESSURE: 40.6 MMHG

## 2024-02-23 PROCEDURE — 93306 TTE W/DOPPLER COMPLETE: CPT

## 2024-02-23 PROCEDURE — 2500000004 HC RX 250 GENERAL PHARMACY W/ HCPCS (ALT 636 FOR OP/ED): Performed by: SURGERY

## 2024-02-23 PROCEDURE — 93306 TTE W/DOPPLER COMPLETE: CPT | Performed by: INTERNAL MEDICINE

## 2024-02-23 RX ADMIN — PERFLUTREN 1.5 ML OF DILUTION: 6.52 INJECTION, SUSPENSION INTRAVENOUS at 10:08

## 2024-02-23 NOTE — TELEPHONE ENCOUNTER
Patient called today to get assistance with transplant evaluation testing.  Patient no longer has a doctor through which she can get a mammogram or pap and she wants to know what the best thing to do is.  I advised the patient to find a provider close to her and once she has either scheduled and or completed the testing she can call us to let us know.  Then, at that time we can request the test results from the source.  Patient verbalized understanding.    Patient asked about her Echocardiogram results and I reminded her she has a cardiologist appointment at which point the results will be discussed. Patient denied any further questions.

## 2024-02-26 ENCOUNTER — TELEPHONE (OUTPATIENT)
Dept: TRANSPLANT | Facility: HOSPITAL | Age: 44
End: 2024-02-26
Payer: MEDICARE

## 2024-02-26 NOTE — TELEPHONE ENCOUNTER
I called the patient to inform her that she needs to see a different heart specialist due to her TTE results.  I told the patient that the heart specialist will focus on treatment to help her heart pump better.  She does not need to keep the appointment that is already scheduled and she will be contacted to make a new appointment.  The patient denied any further questions.

## 2024-03-01 ENCOUNTER — COMMITTEE REVIEW (OUTPATIENT)
Dept: TRANSPLANT | Facility: HOSPITAL | Age: 44
End: 2024-03-01
Payer: MEDICARE

## 2024-03-01 ENCOUNTER — TELEPHONE (OUTPATIENT)
Dept: TRANSPLANT | Facility: HOSPITAL | Age: 44
End: 2024-03-01
Payer: MEDICARE

## 2024-03-01 ENCOUNTER — DOCUMENTATION (OUTPATIENT)
Dept: TRANSPLANT | Facility: HOSPITAL | Age: 44
End: 2024-03-01
Payer: MEDICARE

## 2024-03-01 NOTE — COMMITTEE REVIEW
Presentation for Listing Evaluation Date: 1/5/2024  Committee Review Date: 2/29/2024    Organ being evaluated for: Kidney    Transplant Phase: Evaluation  Transplant Status: Active    Referring Physician: Aracely Fountain    Primary Diagnosis:   Secondary Diagnosis:     Committee Review Decision: Declined      Committee Discussion Details: Resolution: Close evaluation. Patient not a candidate due to low EF and multiple comorbidities.   The candidate's evaluation was presented and discussed at the Kidney  Multidisciplinary Selection Conference. After review of the candidate's diagnosis and the evaluations of the multidisciplinary team members, it was the consensus of the Selection Committee that the candidate does not meet Kidney Selection Criteria and is Declined for Kidney transplant.    Physician: No concerns  Surgeon: No concerns  Social Work: No concerns  Dietician: No concerns  Pharmacist: No concerns  Transplant Coordinator: No concerns

## 2024-03-01 NOTE — TELEPHONE ENCOUNTER
I called the patient and informed her that the committee had met and deemed her not a candidate due to her low ejection fraction and multiple comorbidities.  Patient wanted to know if she had to see our cardiologist still and I said she could just continue with her own cardiologist.  Patient did not have any more questions.

## 2024-03-01 NOTE — LETTER
2024    Aracely Fountain    RE: Maru Sylvester  : 1980    Dear Dr. Aracely Fountain:     Our multi-disciplinary transplant team completed a review of your patient, Maru Sylvester, on 2024.  I regret to inform you that the decision was not to proceed with placing Maru on the [employer] Organ Sharing (UNOS) waiting list for a Kidney transplant.    Our transplant program consists of surgeons and medical doctors who provide coverage 365 days a year, 24 hours a day.      Please don't hesitate to contact us at Dept: [phone] with any questions or concerns.       Sincerely,      Jacquelin Quach RN            The OS Toll-free Patient Services Line:  Your Resource for Organ Transplant Information    If you have a question regarding your own medical care, you always should call your transplant hospital first. However, for general organ transplant-related information, you should call the [employer] Organ Sharing (UNOS) toll-free patient services line at [phone].  Anyone, including potential transplant candidates, candidates, recipients, family members, friends, living donors, and donor family members, can call this number to:    Talk about organ donation, living donation, the transplant process, the donation process, and transplant policies.  Get a free patient information kit with helpful booklets, waiting list and transplant information, and a list of all transplant hospitals.  Ask questions about the Organ Procurement and Transplantation Network (OPTN) web site (http://optn.transplant.hrsa.gov/), the UNOS Web site (http://unos.org/), or the UNOS web site for living donors and transplant recipients. (http://www.transplantliving.org/).  Learn how UNOS and the OPTN can help you.  Talk about any concerns that you may have with a transplant hospital.    [facility] is a not-for-profit organization that provides the administrative services for the national OPTN under federal contract to the [facility]  Resources and Services Administration (HRSA), an agency under the U.S. Department of Health and Human Services (HHS).    [facility] and the OPTN are responsible for:    Providing educational material for patients, the public, and professionals.  Raising awareness of the need for donated organs and tissue.  Writing organ transplant policy with help from transplant professionals, transplant patients, transplant candidates, donor families, living donors, and the public.  Coordinating organ procurement, matching, and placement.  Collecting information about every organ transplant and donation that occurs in the [place].    Remember, you should contact your transplant hospital directly if you have questions or concerns about your own medical care including medical records, work-up progress, and test results.    [facility] is not your transplant hospital, and staff at [facility] will not be able to transfer you to your transplant hospital, so keep your transplant hospital’s phone number handy.    However, while you research your transplant needs and learn as much as you can about transplantation and donation, we welcome your call to our toll-free patient services line at [phone].      [facility] PIL Final Rev 1-

## 2024-03-01 NOTE — LETTER
March 1, 2024    Maru Nga  [address] [address]      Dear Ms. Sylvester:    Our multi-disciplinary transplant team completed a review of your medical records on 2/29/2024.  I regret to inform you that the decision was not to proceed with placing you on the [facility] Organ [facility]aring (UNOS) waiting list for a Kidney transplant.    Our transplant program consists of surgeons and medical doctors who provide coverage 365 days a year, 24 hours a day.     If you have any questions or concerns regarding your insurance coverage or billing issues, a  is available to speak with you.     It is important to keep us updated of any major changes in your medical condition, contact information and health insurance coverage.     Please don't hesitate to contact us at Dept: [phone] with any questions or concerns. We look forward to working with you through this process.      Sincerely,      Jacquelin Quach RN          The UNOS Toll-free Patient Services Line:  Your Resource for Organ Transplant Information    If you have a question regarding your own medical care, you always should call your transplant hospital first. However, for general organ transplant-related information, you should call the [facility] for Organ Sharing (UNOS) toll-free patient services line at [phone].  Anyone, including potential transplant candidates, candidates, recipients, family members, friends, living donors, and donor family members, can call this number to:    Talk about organ donation, living donation, the transplant process, the donation process, and transplant policies.  Get a free patient information kit with helpful booklets, waiting list and transplant information, and a list of all transplant hospitals.  Ask questions about the Organ Procurement and Transplantation Network (OPTN) web site (http://optn.transplant.hrsa.gov/), the UNOS Web site (http://unos.org/), or the UNOS web site  for living donors and transplant recipients. (http://www.transplantliving.org/).  Learn how [facility] and the OPTN can help you.  Talk about any concerns that you may have with a transplant hospital.    [facility] is a not-for-profit organization that provides the administrative services for the national OPTN under federal contract to the Health Resources and Services Administration (HRSA), an agency under the U.S. Department of Health and Human Services (HHS).    [facility] and the OPTN are responsible for:    Providing educational material for patients, the public, and professionals.  Raising awareness of the need for donated organs and tissue.  Writing organ transplant policy with help from transplant professionals, transplant patients, transplant candidates, donor families, living donors, and the public.  Coordinating organ procurement, matching, and placement.  Collecting information about every organ transplant and donation that occurs in the [place].    Remember, you should contact your transplant hospital directly if you have questions or concerns about your own medical care including medical records, work-up progress, and test results.    [facility] is not your transplant hospital, and staff at [facility] will not be able to transfer you to your transplant hospital, so keep your transplant hospital’s phone number handy.    However, while you research your transplant needs and learn as much as you can about transplantation and donation, we welcome your call to our toll-free patient services line at [phone].      [facility] PIL Final Rev 1-

## 2024-03-06 ENCOUNTER — APPOINTMENT (OUTPATIENT)
Dept: CARDIOLOGY | Facility: CLINIC | Age: 44
End: 2024-03-06
Payer: COMMERCIAL

## 2024-03-20 NOTE — PROGRESS NOTES
TRANSPLANT NEPHROLOGY CONSULT :   KIDNEY TRANSPLANT RECIPIENT EVALUATION        SERVICE DATE: 01/05/2024     REASON FOR CONSULT/CHIEF COMPLAINT:    FOR KIDNEY TRANSPLANT RECIPIENT EVALUATION.    HPI:    Ms. Sylvester is a 43 y.o. female with past medical history significant for :    ESKD. Patient has kidney disease secondary to presumed diabetic nephropathy and hypertensive nephrosclerosis. Patient started dialysis in 2/2020. She was initially on PD and then switched to HD. Failed LUE AVF. She currently dialyzes T/Th/S via RIJ Permacath.  lbd.     DM x diagnosed when she was 22 year old and has been on insulin since 25 year old.  HTN X 10-15 years  HLD  CAD s/p CABG at [facility]  S/p right BKA 7/2022 due to infection  S/p cholecystectomy  S/p C Section  Appendectomy  Ex-smoker    BLOOD TYPE:  A    Functional status :   acceptable    Urine output :   Anuric x 2 years    Potential Donor :  none    Last GFR /Creatinine:   Lab Results   Component Value Date    CREATININE 5.22 (H) 01/05/2024     Creatinine clearance cannot be calculated (Patient's most recent lab result is older than the maximum 7 days allowed.)    Hx of PRBC Transfusion  yes    Pregnancy History  6 - had 4 miscarriage, 2 healthy    Recent Hospitalization/ED visit:  12/2023 RSV    The patient is here for kidney transplant recipient evaluation. Ms. Sylvester has had multiple complications from end stage severe renal disease including anemia, secondary hyperparathyroidism, and osteodystrophy. The patient is here today for an evaluation for kidney transplantation to improve quality of life and decrease the risk of cardiovascular disease, coronary artery disease and stroke.     The patient is doing well without complaints. Denied chest pain, shortness of breath, palpitation, dyspnea on exertion, dysuria, fever, nausea, vomiting, diarrhea and flu-liked symptoms. No swelling of the extremities. No recent hospitalization or ED visit.      ROS:  " Review of  14 systems was performed system by system. See HPI. Otherwise, the symptoms were negative.    PAST MEDICAL HISTORY: Please see HPI.    No past medical history on file.     PAST SURGICAL HISTORY: Please see HPI.    No past surgical history on file.     SOCIAL HISTORY: Please see our 's note for details.    Social History     Socioeconomic History    Marital status: Unknown     Spouse name: Not on file    Number of children: Not on file    Years of education: Not on file    Highest education level: Not on file   Occupational History    Not on file   Tobacco Use    Smoking status: Not on file    Smokeless tobacco: Not on file   Substance and Sexual Activity    Alcohol use: Not on file    Drug use: Not on file    Sexual activity: Not on file   Other Topics Concern    Not on file   Social History Narrative    Not on file     Social Determinants of Health     Financial Resource Strain: Not on file   Food Insecurity: Not on file   Transportation Needs: Not on file   Physical Activity: Not on file   Stress: Not on file   Social Connections: Not on file   Intimate Partner Violence: Not on file   Housing Stability: Not on file       FAMILY HISTORY:  No family history on file.    MEDICATION LIST:  No current outpatient medications    ALLERGY  No Known Allergies    PHYSICAL EXAM:    Visit Vitals  BP (!) 156/106   Pulse 89   Temp 36.4 °C (97.5 °F) (Temporal)   Ht 1.6 m (5' 3\")   Wt 90.3 kg (199 lb)   SpO2 97%   BMI 35.25 kg/m²   BSA 2 m²        General Appearance - NAD, Good speech, oriented and alert  HEENT - Supple. Not pale. No jaundice. No cervical lymphadenopathy. Pharynx and tonsils are not injected.  CVS - RRR. Normal S1/S2. No murmur, click , rub or gallop  Lungs- clear to auscultation bilaterally  Abdomen - soft , not tender, no guarding, no rigidity. No hepatosplenomegaly. Normal bowel sounds. No masses and ascites. S/P Kidney transplant .  Transplanted kidney is not tender.   Musculoskeletal " /Extremities - no edema. Full ROM. No joint tenderness.   Neuro/Psych - appropriate mood and affect. Motor power V/V all extremities. CN I -XII were grossly intact.  Skin - No visible rash  Dialysis access : RIJ Permacath is clean, dry and intact. No signs of infection.    LABS:    Lab Results   Component Value Date    WBC 4.7 01/05/2024    HGB 11.1 (L) 01/05/2024    HCT 35.5 (L) 01/05/2024     01/05/2024    ALT 18 01/05/2024    AST 19 01/05/2024    CREATININE 5.22 (H) 01/05/2024    BUN 36 (H) 01/05/2024    INR 1.1 01/05/2024    HGBA1C 7.8 (H) 01/05/2024     par    EKG:  No results found for this or any previous visit (from the past 4464 hour(s)).    Echocardiogram:   No results found for this or any previous visit from the past 1825 days.      Cardiac Catheterization:   No results found for this or any previous visit from the past 1825 days.  No results found for this or any previous visit from the past 3650 days.       ASSESSMENT AND PLAN:    After completion of taking history and physical examination, the patient seems to be a  reasonable candidate to proceed with the rest of transplant evaluation.    However, patient will need the following tests to determine the eligibility for kidney transplant per [facility]'s kidney transplant evaluation guideline :    - cardiac clearance per protocol  - CT A/P  -Update cancer screening per age/sex  - Will need to complete the rest of work up per protocol    =========================================================================    The case will be presented at the selection committee at the [facility]plant [place].  The final decision from the committee will be sent out to notify the patient/primary care physician/ nephrologist. The above recommendations were discussed with the patient at length.     In addition, the following were also discussed:    - Risks and benefits of transplantation, both short-term and long-term    - Risk  of primary graft non-function, DGF, SGF, rejection, primary disease recurrence, return to dialysis    - Risks of immunosuppression including infections, CA, CV risk    - Need for compliance with medications and medical care in general    - We reviewed the necessity of HBV vaccination and other recommended vaccines before a kidney transplant, following the Centers for Disease Control and Prevention(CDC)'s guidelines [https://www.cdc.gov/vaccines/schedules/downloads/adult/adult-combined-schedule.pdf]     Currently, the patient has received the following vaccines:    Immunization History   Administered Date(s) Administered    Pfizer Gray Cap SARS-CoV-2 05/20/2022         The patient expressed understanding of the above and wishes to proceed.  I answered all of his questions. I urged the patient to look for living donors.    - I have spent over 60 minutes with the patient, reviewing medical record, lab result , CXR result and other specialty's notes. More than 50% of the time was spent in counseling, explaining about the transplantation and answering the questions. I also reviewed the medical record, blood test results, imaging and previous studies which were obtained from the nephrologists. I also order the tests needed to complete the evaluation and I will review the results of those tests.    Thank you for this consultation. Please feel free to contact me for questions.      Arturo Wade    Transplant Nephrology

## 2024-04-03 ENCOUNTER — APPOINTMENT (OUTPATIENT)
Dept: CARDIOLOGY | Facility: HOSPITAL | Age: 44
End: 2024-04-03
Payer: COMMERCIAL